# Patient Record
Sex: MALE | Employment: OTHER | URBAN - METROPOLITAN AREA
[De-identification: names, ages, dates, MRNs, and addresses within clinical notes are randomized per-mention and may not be internally consistent; named-entity substitution may affect disease eponyms.]

---

## 2019-03-12 ENCOUNTER — TRANSFERRED RECORDS (OUTPATIENT)
Dept: HEALTH INFORMATION MANAGEMENT | Facility: CLINIC | Age: 44
End: 2019-03-12

## 2020-09-21 ENCOUNTER — TRANSFERRED RECORDS (OUTPATIENT)
Dept: HEALTH INFORMATION MANAGEMENT | Facility: CLINIC | Age: 45
End: 2020-09-21

## 2020-09-21 DIAGNOSIS — D32.9 MENINGIOMA (H): Primary | ICD-10-CM

## 2020-09-23 ENCOUNTER — OFFICE VISIT (OUTPATIENT)
Dept: RADIATION ONCOLOGY | Facility: CLINIC | Age: 45
End: 2020-09-23
Attending: RADIOLOGY
Payer: COMMERCIAL

## 2020-09-23 ENCOUNTER — HOSPITAL ENCOUNTER (OUTPATIENT)
Dept: MEDSURG UNIT | Facility: CLINIC | Age: 45
End: 2020-09-23
Attending: RADIOLOGY
Payer: COMMERCIAL

## 2020-09-23 ENCOUNTER — HOSPITAL ENCOUNTER (OUTPATIENT)
Dept: MRI IMAGING | Facility: CLINIC | Age: 45
End: 2020-09-23
Attending: NEUROLOGICAL SURGERY
Payer: COMMERCIAL

## 2020-09-23 VITALS
SYSTOLIC BLOOD PRESSURE: 140 MMHG | HEART RATE: 91 BPM | DIASTOLIC BLOOD PRESSURE: 92 MMHG | RESPIRATION RATE: 20 BRPM | OXYGEN SATURATION: 95 %

## 2020-09-23 VITALS
RESPIRATION RATE: 20 BRPM | OXYGEN SATURATION: 96 % | SYSTOLIC BLOOD PRESSURE: 138 MMHG | DIASTOLIC BLOOD PRESSURE: 80 MMHG | HEART RATE: 85 BPM

## 2020-09-23 DIAGNOSIS — D32.9 MENINGIOMA (H): ICD-10-CM

## 2020-09-23 DIAGNOSIS — D32.9 MENINGIOMA (H): Primary | ICD-10-CM

## 2020-09-23 PROCEDURE — 70552 MRI BRAIN STEM W/DYE: CPT

## 2020-09-23 PROCEDURE — 25000131 ZZH RX MED GY IP 250 OP 636 PS 637: Mod: ZF | Performed by: NEUROLOGICAL SURGERY

## 2020-09-23 PROCEDURE — 25000128 H RX IP 250 OP 636: Mod: ZF | Performed by: NEUROLOGICAL SURGERY

## 2020-09-23 PROCEDURE — 25000125 ZZHC RX 250: Mod: ZF | Performed by: NEUROLOGICAL SURGERY

## 2020-09-23 PROCEDURE — 40000172 ZZH STATISTIC PROCEDURE PREP ONLY

## 2020-09-23 PROCEDURE — 77334 RADIATION TREATMENT AID(S): CPT | Performed by: RADIOLOGY

## 2020-09-23 PROCEDURE — 77370 RADIATION PHYSICS CONSULT: CPT | Performed by: RADIOLOGY

## 2020-09-23 PROCEDURE — 25000132 ZZH RX MED GY IP 250 OP 250 PS 637: Mod: ZF | Performed by: NEUROLOGICAL SURGERY

## 2020-09-23 PROCEDURE — 25500064 ZZH RX 255 OP 636: Performed by: NEUROLOGICAL SURGERY

## 2020-09-23 PROCEDURE — A9585 GADOBUTROL INJECTION: HCPCS | Performed by: NEUROLOGICAL SURGERY

## 2020-09-23 PROCEDURE — 77371 SRS MULTISOURCE: CPT | Performed by: RADIOLOGY

## 2020-09-23 PROCEDURE — 77300 RADIATION THERAPY DOSE PLAN: CPT | Performed by: RADIOLOGY

## 2020-09-23 PROCEDURE — 77295 3-D RADIOTHERAPY PLAN: CPT | Performed by: RADIOLOGY

## 2020-09-23 RX ORDER — HYDROCODONE BITARTRATE AND ACETAMINOPHEN 5; 325 MG/1; MG/1
1-2 TABLET ORAL EVERY 6 HOURS PRN
Status: DISCONTINUED | OUTPATIENT
Start: 2020-09-23 | End: 2020-09-23 | Stop reason: HOSPADM

## 2020-09-23 RX ORDER — FEXOFENADINE HCL AND PSEUDOEPHEDRINE HCI 60; 120 MG/1; MG/1
1 TABLET, EXTENDED RELEASE ORAL 2 TIMES DAILY
COMMUNITY

## 2020-09-23 RX ORDER — ACETAMINOPHEN 325 MG/1
325-650 TABLET ORAL EVERY 6 HOURS PRN
COMMUNITY

## 2020-09-23 RX ORDER — LORAZEPAM 0.5 MG/1
.5-1 TABLET ORAL
Status: COMPLETED | OUTPATIENT
Start: 2020-09-23 | End: 2020-09-23

## 2020-09-23 RX ORDER — ONDANSETRON 4 MG/1
8 TABLET, ORALLY DISINTEGRATING ORAL EVERY 6 HOURS PRN
Status: DISCONTINUED | OUTPATIENT
Start: 2020-09-23 | End: 2020-09-23 | Stop reason: HOSPADM

## 2020-09-23 RX ORDER — LORAZEPAM 0.5 MG/1
.5-2 TABLET ORAL
Status: COMPLETED | OUTPATIENT
Start: 2020-09-23 | End: 2020-09-23

## 2020-09-23 RX ORDER — LIDOCAINE 40 MG/G
1 CREAM TOPICAL SEE ADMIN INSTRUCTIONS
Status: COMPLETED | OUTPATIENT
Start: 2020-09-23 | End: 2020-09-23

## 2020-09-23 RX ORDER — ONDANSETRON 2 MG/ML
4 INJECTION INTRAMUSCULAR; INTRAVENOUS
Status: DISCONTINUED | OUTPATIENT
Start: 2020-09-23 | End: 2020-09-23 | Stop reason: HOSPADM

## 2020-09-23 RX ORDER — GADOBUTROL 604.72 MG/ML
10 INJECTION INTRAVENOUS ONCE
Status: COMPLETED | OUTPATIENT
Start: 2020-09-23 | End: 2020-09-23

## 2020-09-23 RX ORDER — DEXAMETHASONE 4 MG/1
4 TABLET ORAL
Status: COMPLETED | OUTPATIENT
Start: 2020-09-23 | End: 2020-09-23

## 2020-09-23 RX ORDER — ACETAMINOPHEN 325 MG/1
650 TABLET ORAL EVERY 4 HOURS PRN
Status: DISCONTINUED | OUTPATIENT
Start: 2020-09-23 | End: 2020-09-23 | Stop reason: HOSPADM

## 2020-09-23 RX ADMIN — LIDOCAINE 1 G: 40 CREAM TOPICAL at 05:55

## 2020-09-23 RX ADMIN — GADOBUTROL 10 ML: 604.72 INJECTION INTRAVENOUS at 08:18

## 2020-09-23 RX ADMIN — LIDOCAINE HYDROCHLORIDE,EPINEPHRINE BITARTRATE 30 ML: 20; .01 INJECTION, SOLUTION INFILTRATION; PERINEURAL at 05:55

## 2020-09-23 RX ADMIN — LORAZEPAM 1 MG: 0.5 TABLET ORAL at 10:52

## 2020-09-23 RX ADMIN — DEXAMETHASONE 4 MG: 4 TABLET ORAL at 11:16

## 2020-09-23 RX ADMIN — LORAZEPAM 2 MG: 0.5 TABLET ORAL at 05:56

## 2020-09-23 NOTE — PROGRESS NOTES
GAMMA KNIFE RADIOSURGERY TREATMENT SUMMARY  DEPARTMENT OF RADIATION ONCOLOGY    Name: Rasta Taylor                                        : 1975                 Medical Record #: [id number]                       Diagnosis:  Meningioma                                  Date of Treatment: 2020                                       Referring Physicians:  Efe Doherty, Karissa Jones, Edith Lizama, Tumor Registry     BRIEF CLINICAL HISTORY:                                                                                                 The patient is a 45 year old white male who was well until 2015 when he fell at work and sustained a concussion and neck injury.  Because of persistent headaches, he underwent a brain MRI in  which showed an incidental 1.6cm left parietal parasagittal meningioma.  Repeat brain MRI 17 showed growth of the lesion to 1.9cm.  On 10/30/17, he underwent a craniotomy and near total resection of the tumor.  Pathology confirmed a grade 1 meningioma. Follow up MRIs in  and  showed a small amount of residual tumor.  MRI 20 showed growth of the tumor to 1.1 cm.  He underwent GK radiosurgery as detailed below.  TECHNICAL SUMMARY        Collimators    # Site Energy Min Tumor Dose (Gy) IDL (%) # Size (mm) Treatment Vol. (cc)   1 Left Post. Parietal Parasagittal Meningioma [place] 60 13 50 18 4,8 3.82     DESCRIPTION OF PROCEDURE:                                                                              On 2020 the patient was brought to the Gamma Knife suite at [facility].  After sedation and topical anesthetic, the head frame was put on by Dr. Efe Doherty.  The patient was then taken to the department of Radiology where a stereotactic brain MRI was performed.  The patient was admitted to the [facility] where he rested comfortably while treatment planning was completed.  The [employer] Gamma Plan software was  used to create a highly conformal dose distribution using the number and size collimators detailed above.  The patient was brought to the Gamma Knife suite.  The treatment was delivered using the [employer] Gamma Knife ICON without complication.  The head frame was removed and the patient was discharged home in stable condition.  FOLLOW-UP PLANS:                                                                                                        The Gamma Knife Nurse Coordinator will call the patient tomorrow for short-term follow up.  He should have a brain MRI in 12 months and every 12 months thereafter.  The patient will also follow-up with Ada Doherty and Karen.  I would be happy to see him anytime.    Andrey Chong MD, [facility], [facility]

## 2020-09-23 NOTE — LETTER
9/23/2020         RE: Rasta Taylor  [address] [place] [address]        Dear Colleague,    Thank you for referring your patient, Rasta Taylor, to the [facility] [place], RADIATION ONCOLOGY. Please see a copy of my visit note below.    PREOPERATIVE DIAGNOSIS:  Recurrent meningioma    POSTOPERATIVE DIAGNOSIS:  Same    OPERATIVE PROCEDURES:  1.  Gamma Knife radiosurgery to recurrent left parietal meningioma  2.  Application of stereotactic head frame for stereotactic radiosurgery    SURGEON:  Efe Doherty MD    ASSISTANT:  None    ANESTHESIA:  Local    INDICATIONS FOR THE PROCEDURE:  Mr. Rasta Taylor is a 46 y/o male with a history of left parietal meningioma WHO grade 1.  Mr. Taylor underwent craniotomy for tumor resection in October 2017.  He has since been followed with serial imaging.  Recent imaging has shown growth in residual/recurrent tumor associated with the posterior superior sagittal sinus.  Gamma Knife stereotactic radiosurgery was now recommended to treat this lesion.    DESCRIPTION OF THE PROCEDURE:  On the morning of the procedure, the patient was brought to the Gamma Knife radiosurgery area.  A pre-procedure pause was performed to confirm the identity and date of birth of the patient, as well as the procedure site.  The scalp was prepped with betadine and then anesthetized with a combination of marcaine, lidocaine, and epinephrine.  The [employer] G-frame was applied and the pins were fixed to hand tightness.  The patient tolerated the application of the frame well.    The patient was taken to the MRI scanner where an MRI with gadolinium contrast was obtained.  The patient returned from MRI.  The lesion was outlined on the planning software and we made a conformal dose outline for this lesion.  Dr. Chong selected the radiation dose for the lesion.  Measurements were taken,  steps performed, and the patient was then brought into the treatment room.  Radiation was given  according to the prescription.    The patient was then taken out of the unit and out of the treatment room.  The stereotactic frame was removed and a dressing was applied.  After observation, the patient was discharged.  Follow up arrangements will be made for the patient.    I attest that I was present for the entirety of the case, including pre-procedure assessment, stereotactic head frame placement, treatment planning, treatment delivery, as well as frame removal at the end of the treatment.            GAMMA KNIFE RADIOSURGERY TREATMENT SUMMARY  DEPARTMENT OF RADIATION ONCOLOGY    Name: Rasta Taylor                                        : 1975                 Medical Record #: [id number]                       Diagnosis:  Meningioma                                  Date of Treatment: 2020                                       Referring Physicians:  Efe Doherty, Karissa Jones, Edith Lizama, Tumor Registry     BRIEF CLINICAL HISTORY:                                                                                                 The patient is a 45 year old white male who was well until 2015 when he fell at work and sustained a concussion and neck injury.  Because of persistent headaches, he underwent a brain MRI in  which showed an incidental 1.6cm left parietal parasagittal meningioma.  Repeat brain MRI 17 showed growth of the lesion to 1.9cm.  On 10/30/17, he underwent a craniotomy and near total resection of the tumor.  Pathology confirmed a grade 1 meningioma. Follow up MRIs in  and 2019 showed a small amount of residual tumor.  MRI 20 showed growth of the tumor to 1.1 cm.  He underwent GK radiosurgery as detailed below.  TECHNICAL SUMMARY        Collimators    # Site Energy Min Tumor Dose (Gy) IDL (%) # Size (mm) Treatment Vol. (cc)   1 Left Post. Parietal Parasagittal Meningioma [place] 60 13 50 18 4,8 3.82     DESCRIPTION OF PROCEDURE:                                                                               On 2020 the patient was brought to the Gamma Knife suite at [facility].  After sedation and topical anesthetic, the head frame was put on by Dr. Efe Doherty.  The patient was then taken to the department of Radiology where a stereotactic brain MRI was performed.  The patient was admitted to the [facility] where he rested comfortably while treatment planning was completed.  The [employer] Gamma Plan software was used to create a highly conformal dose distribution using the number and size collimators detailed above.  The patient was brought to the Gamma Knife suite.  The treatment was delivered using the [employer] Gamma Knife ICON without complication.  The head frame was removed and the patient was discharged home in stable condition.  FOLLOW-UP PLANS:                                                                                                        The Gamma Knife Nurse Coordinator will call the patient tomorrow for short-term follow up.  He should have a brain MRI in 12 months and every 12 months thereafter.  The patient will also follow-up with Ada Doherty and Karen.  I would be happy to see him anytime.    Andrey Chong MD, [facility], [facility]        GAMMA KNIFE RADIOSURGERY TREATMENT NOTE  DEPARTMENT OF RADIATION ONCOLOGY    Name: Rasta Taylor    : 1975 Medical Record #: [id number]   Diagnosis: Meningioma   Date of Treatment: 20   Referring Physicians: No referring provider defined for this encounter.     The patient was assessed prior to treatment and wished to proceed with the treatment.  All imaging, accurate patient set-up and positioning was reviewed by myself.  Dose delivery and treatment parameters were also reviewed and are within treatment specifications.  GK radiosurgery was delivered as detailed in the treatment summary note.  The patient was assessed again after treatment with no change in  status.    S: No new complaints or symptoms.  O: AVSS.  Neurological exam shows no new neurologic deficits.  A: stable  P: He will have a followup brain MRI annually and see Dr. Doherty.  I would be happy to see him anytime.      Andrey Chong MD, [facility], [facility]

## 2020-09-23 NOTE — PROGRESS NOTES
Pt concerned with cloustrophobia with treatment; discussed with Gamma RN, gave 4 mg Ativan as that is what he has this am and worked well and pt remained alert but comfortable. Med given per timing suggested by gamma staff in order to be working by treatment time.

## 2020-09-23 NOTE — PROGRESS NOTES
GAMMA KNIFE RADIOSURGERY TREATMENT NOTE  DEPARTMENT OF RADIATION ONCOLOGY    Name: Rasta Taylor    : 1975 Medical Record #: [id number]   Diagnosis: Meningioma   Date of Treatment: 20   Referring Physicians: No referring provider defined for this encounter.     The patient was assessed prior to treatment and wished to proceed with the treatment.  All imaging, accurate patient set-up and positioning was reviewed by myself.  Dose delivery and treatment parameters were also reviewed and are within treatment specifications.  GK radiosurgery was delivered as detailed in the treatment summary note.  The patient was assessed again after treatment with no change in status.    S: No new complaints or symptoms.  O: AVSS.  Neurological exam shows no new neurologic deficits.  A: stable  P: He will have a followup brain MRI annually and see Dr. Doherty.  I would be happy to see him anytime.      Andrey Chong MD, [facility], [facility]

## 2020-09-23 NOTE — PROGRESS NOTES
Pt on 2A post halo placement for Gamma treatment; pt awake and alert, denies pain. Family at bedside. Will house on 2A until ready for procedure. Oriented to unit.

## 2020-09-23 NOTE — PROGRESS NOTES
PREOPERATIVE DIAGNOSIS:  Recurrent meningioma    POSTOPERATIVE DIAGNOSIS:  Same    OPERATIVE PROCEDURES:  1.  Gamma Knife radiosurgery to recurrent left parietal meningioma  2.  Application of stereotactic head frame for stereotactic radiosurgery    SURGEON:  Efe Doherty MD    ASSISTANT:  None    ANESTHESIA:  Local    INDICATIONS FOR THE PROCEDURE:  Mr. Rasta Taylor is a 46 y/o male with a history of left parietal meningioma WHO grade 1.  Mr. Taylor underwent craniotomy for tumor resection in October 2017.  He has since been followed with serial imaging.  Recent imaging has shown growth in residual/recurrent tumor associated with the posterior superior sagittal sinus.  Gamma Knife stereotactic radiosurgery was now recommended to treat this lesion.    DESCRIPTION OF THE PROCEDURE:  On the morning of the procedure, the patient was brought to the Gamma Knife radiosurgery area.  A pre-procedure pause was performed to confirm the identity and date of birth of the patient, as well as the procedure site.  The scalp was prepped with betadine and then anesthetized with a combination of marcaine, lidocaine, and epinephrine.  The [employer] G-frame was applied and the pins were fixed to hand tightness.  The patient tolerated the application of the frame well.    The patient was taken to the MRI scanner where an MRI with gadolinium contrast was obtained.  The patient returned from MRI.  The lesion was outlined on the planning software and we made a conformal dose outline for this lesion.  Dr. Chong selected the radiation dose for the lesion.  Measurements were taken,  steps performed, and the patient was then brought into the treatment room.  Radiation was given according to the prescription.    The patient was then taken out of the unit and out of the treatment room.  The stereotactic frame was removed and a dressing was applied.  After observation, the patient was discharged.  Follow up arrangements will  be made for the patient.    I attest that I was present for the entirety of the case, including pre-procedure assessment, stereotactic head frame placement, treatment planning, treatment delivery, as well as frame removal at the end of the treatment.

## 2020-09-28 ENCOUNTER — TELEPHONE (OUTPATIENT)
Dept: RADIATION ONCOLOGY | Facility: CLINIC | Age: 45
End: 2020-09-28

## 2020-09-28 NOTE — TELEPHONE ENCOUNTER
A telephone voice message had been left for Jorge on 9/24 post Gamma Knife 9/23/20 to see how he did.  Jorge called back today and stated that on Friday 9/25/20 he did speak to Dr. Rojo regarding fatigue and headaches and was started on a Solu Medrol dose glen.  Jorge stated that he is on day three and the headaches are resolved but he is still feeling fatigued, but he does feel betteer.

## 2021-05-09 ENCOUNTER — HEALTH MAINTENANCE LETTER (OUTPATIENT)
Age: 46
End: 2021-05-09

## 2021-10-24 ENCOUNTER — HEALTH MAINTENANCE LETTER (OUTPATIENT)
Age: 46
End: 2021-10-24

## 2022-06-05 ENCOUNTER — HEALTH MAINTENANCE LETTER (OUTPATIENT)
Age: 47
End: 2022-06-05

## 2022-10-15 ENCOUNTER — HEALTH MAINTENANCE LETTER (OUTPATIENT)
Age: 47
End: 2022-10-15

## 2023-06-11 ENCOUNTER — HEALTH MAINTENANCE LETTER (OUTPATIENT)
Age: 48
End: 2023-06-11

## 2024-04-04 ENCOUNTER — TRANSFERRED RECORDS (OUTPATIENT)
Dept: HEALTH INFORMATION MANAGEMENT | Facility: CLINIC | Age: 49
End: 2024-04-04
Payer: COMMERCIAL

## 2024-04-04 ENCOUNTER — MEDICAL CORRESPONDENCE (OUTPATIENT)
Dept: HEALTH INFORMATION MANAGEMENT | Facility: CLINIC | Age: 49
End: 2024-04-04
Payer: COMMERCIAL

## 2024-04-05 DIAGNOSIS — R40.4 SPELL OF ALTERED CONSCIOUSNESS: Primary | ICD-10-CM

## 2024-04-10 ENCOUNTER — OFFICE VISIT (OUTPATIENT)
Dept: CARDIOLOGY | Facility: CLINIC | Age: 49
End: 2024-04-10
Attending: PSYCHIATRY & NEUROLOGY
Payer: COMMERCIAL

## 2024-04-10 ENCOUNTER — ORDERS ONLY (AUTO-RELEASED) (OUTPATIENT)
Dept: CARDIOLOGY | Facility: CLINIC | Age: 49
End: 2024-04-10
Payer: COMMERCIAL

## 2024-04-10 VITALS
DIASTOLIC BLOOD PRESSURE: 77 MMHG | HEIGHT: 70 IN | WEIGHT: 272.9 LBS | BODY MASS INDEX: 39.07 KG/M2 | HEART RATE: 94 BPM | SYSTOLIC BLOOD PRESSURE: 110 MMHG | OXYGEN SATURATION: 97 %

## 2024-04-10 DIAGNOSIS — E66.09 CLASS 2 OBESITY DUE TO EXCESS CALORIES WITHOUT SERIOUS COMORBIDITY WITH BODY MASS INDEX (BMI) OF 39.0 TO 39.9 IN ADULT: ICD-10-CM

## 2024-04-10 DIAGNOSIS — G47.33 OBSTRUCTIVE SLEEP APNEA SYNDROME: ICD-10-CM

## 2024-04-10 DIAGNOSIS — I10 BENIGN ESSENTIAL HYPERTENSION: ICD-10-CM

## 2024-04-10 DIAGNOSIS — E66.812 CLASS 2 OBESITY DUE TO EXCESS CALORIES WITHOUT SERIOUS COMORBIDITY WITH BODY MASS INDEX (BMI) OF 39.0 TO 39.9 IN ADULT: ICD-10-CM

## 2024-04-10 DIAGNOSIS — R55 SYNCOPE, UNSPECIFIED SYNCOPE TYPE: Primary | ICD-10-CM

## 2024-04-10 DIAGNOSIS — R55 SYNCOPE, UNSPECIFIED SYNCOPE TYPE: ICD-10-CM

## 2024-04-10 PROCEDURE — 99204 OFFICE O/P NEW MOD 45 MIN: CPT | Performed by: INTERNAL MEDICINE

## 2024-04-10 RX ORDER — LOSARTAN POTASSIUM AND HYDROCHLOROTHIAZIDE 12.5; 5 MG/1; MG/1
1 TABLET ORAL
COMMUNITY
Start: 2024-04-03

## 2024-04-10 RX ORDER — CITALOPRAM HYDROBROMIDE 20 MG/1
20 TABLET ORAL DAILY
COMMUNITY

## 2024-04-10 RX ORDER — LANOLIN ALCOHOL/MO/W.PET/CERES
3 CREAM (GRAM) TOPICAL AT BEDTIME
COMMUNITY

## 2024-04-10 NOTE — LETTER
4/10/2024    [facility]  [address] [place] [address]    RE: Rasta Taylor       Dear Colleague,     I had the pleasure of seeing Rasta Taylor in the [facility].  HPI and Plan:   Rasta is a very nice 49-year-old gentleman referred for evaluation after a syncopal spell.    Rasta has a past medical history significant for concussion about 11 years ago.  Previous meningioma removal, obstructive sleep apnea, obesity, and hypertension.  He has no family history of coronary disease with his mom and dad still alive in their late 70s.  He does not have diabetes mellitus.  I cannot find any cholesterol profile.  He is a lifelong non-smoker.  He states he drinks 5-10 beers on the weekend but nothing during the week.    Review of ER records and Rasta states he was out for dinner.  He had had 2 beers there finishing up dinner when he began to feel like he was graying out.  His wife and friends noted and lowered him to the floor.  They report no seizure activity or loss of urine.  They think he was out for about 4 to 5 minutes.  And then came to without any postictal state.  In the ER he was found to be quite hypotensive.  Monitoring demonstrated no arrhythmias.  He had normal blood work including troponins electrolytes CBC creatinine was a bit elevated 1.45 giving him a GFR of 59.  He states he had eaten that day but may be had not drank very much.  At that time his antihypertensive regimen included lisinopril and hydrochlorothiazide.  Propranolol was listed although he states he has not taken that in years.  He has subsequently changed to losartan hydrochlorothiazide because of a cough.  EKG demonstrated normal sinus rhythm incomplete right bundle branch block nonspecific ST-T wave changes.  He also reports having COVID about 1 month prior to this event.    Patient denies having chest, arm, neck, jaw or shoulder discomfort.  No dyspnea on exertion, orthopnea, or PND.  No  palpitations, lightheadedness, dizziness, syncope, or near syncope.  No peripheral edema.    Assessment and plan.  ER's assessment and I am in agreement that this appears to be most likely hypotensive in origin.  He probably was a bit intravascularly deplete with his hydrochlorothiazide and not drinking enough fluids.  There is also the vasodilating effect of his lisinopril and alcohol.  Blood pressure today is 110/77.    At this time to proceed with a stress echocardiogram.  This will allow us to see if he has structurally normal heart.  It will also see what his heart rate and blood pressure response is to exercise.  When he comes in I will have him come in fasting to check a fasting lipid profile.  If this is significantly elevated I may consider a calcium score.    I will also set him up with a 7-day Zio patch to look for any silent arrhythmias or heart block.    Long-term I have recommended he stay well-hydrated.  Exercise regularly and lose weight.    Further evaluation treatment depend upon the above results. Thank you for allow me to participate in this patient's care.  Sincerely,                               Jhon Chang MD [facility]            Today's clinic visit entailed:  Review of the result(s) of each unique test - ER records, [facility] records neurology records, lab work, EKG  The following tests were independently interpreted by me as noted in my documentation: EKG  Ordering of each unique test  Prescription drug management  46 minutes spent by me on the date of the encounter doing chart review, history and exam, documentation and further activities per the note  Provider  Link to [facility] Help Grid     The level of medical decision making during this visit was of moderate complexity.      Orders Placed This Encounter   Procedures    Exercise Stress Echocardiogram       Orders Placed This Encounter   Medications    citalopram (CELEXA) 20 MG tablet     Sig: Take 20 mg by mouth daily     losartan-hydrochlorothiazide (HYZAAR) 50-12.5 MG tablet     Sig: Take 1 tablet by mouth daily at 2 pm    melatonin 3 MG tablet     Sig: Take 3 mg by mouth at bedtime       There are no discontinued medications.      Encounter Diagnoses   Name Primary?    Syncope, unspecified syncope type Yes    Class 2 obesity due to excess calories without serious comorbidity with body mass index (BMI) of 39.0 to 39.9 in adult     Benign essential hypertension     Obstructive sleep apnea syndrome        CURRENT MEDICATIONS:  Current Outpatient Medications   Medication Sig Dispense Refill    acetaminophen (TYLENOL) 325 MG tablet Take 325-650 mg by mouth every 6 hours as needed for mild pain      citalopram (CELEXA) 20 MG tablet Take 20 mg by mouth daily      fexofenadine-pseudoePHEDrine (ALLEGRA-D)  MG 12 hr tablet Take 1 tablet by mouth 2 times daily      melatonin 3 MG tablet Take 3 mg by mouth at bedtime      losartan-hydrochlorothiazide (HYZAAR) 50-12.5 MG tablet Take 1 tablet by mouth daily at 2 pm (Patient not taking: Reported on 4/10/2024)         ALLERGIES   No Known Allergies    PAST MEDICAL HISTORY:  No past medical history on file.    PAST SURGICAL HISTORY:  No past surgical history on file.    FAMILY HISTORY:  No family history on file.    SOCIAL HISTORY:  Social History     Socioeconomic History    Marital status:      Social Determinants of Health     Interpersonal Safety: Not At Risk (3/1/2024)    Received from [facility]     Humiliation, Afraid, Rape, and Kick questionnaire     Fear of Current or Ex-Partner: No     Emotionally Abused: No     Physically Abused: No     Sexually Abused: No       Review of Systems:  Skin:  Negative rash;bruising;lumps or bumps     Eyes:  Negative visual blurring;double vision    ENT:  Negative tinnitus;vertigo    Respiratory:  Negative shortness of breath     Cardiovascular:  Negative;palpitations;chest pain;edema Positive for;fatigue;lightheadedness Patient  "stated they get lightheaded one or twice a day randomly  Gastroenterology: Negative poor appetite;nausea;vomiting    Genitourinary:  Negative urinary frequency;urgency;hesitancy    Musculoskeletal:  Negative gout;fibromyalgia    Neurologic:  Positive for headaches;numbness or tingling of hands;numbness or tingling of feet Patient stated they experience headaches and numbness and tingling in the hands and feet daily  Psychiatric:  Negative excessive stress;sleep disturbances;anxiety;depression    Heme/Lymph/Imm:  Negative anemia;bleeding disorder    Endocrine:  Negative thyroid disorder;diabetes      Physical Exam:  Vitals: /77 (BP Location: Left arm, Patient Position: Sitting)   Pulse 94   Ht 1.778 m (5' 10\")   Wt 123.8 kg (272 lb 14.4 oz)   SpO2 97%   BMI 39.16 kg/m      Constitutional:  cooperative, alert and oriented, well developed, well nourished, in no acute distress obese      Skin:  warm and dry to the touch, no apparent skin lesions or masses noted          Head:  normocephalic, no masses or lesions        Eyes:  pupils equal and round, conjunctivae and lids unremarkable, sclera white, no xanthalasma, EOMS intact, no nystagmus        Lymph:      ENT:  no pallor or cyanosis, dentition good        Neck:  no carotid bruit        Respiratory:  normal breath sounds, clear to auscultation, normal A-P diameter, normal symmetry, normal respiratory excursion, no use of accessory muscles         Cardiac: regular rhythm;no murmurs, gallops or rubs detected                pulses full and equal                                        GI:           Extremities and Muscular Skeletal:  no edema;no spinal abnormalities noted;normal muscle strength and tone              Neurological:  no gross motor deficits        Psych:  affect appropriate, oriented to time, person and place        CC  Arnold Calix MD  [facility]  [address] [address]      Thank you for allowing me to " participate in the care of your patient.      Sincerely,     Jhon Chang MD     [facility]

## 2024-04-10 NOTE — PROGRESS NOTES
HPI and Plan:   Rasta is a very nice 49-year-old gentleman referred for evaluation after a syncopal spell.    Rasta has a past medical history significant for concussion about 11 years ago.  Previous meningioma removal, obstructive sleep apnea, obesity, and hypertension.  He has no family history of coronary disease with his mom and dad still alive in their late 70s.  He does not have diabetes mellitus.  I cannot find any cholesterol profile.  He is a lifelong non-smoker.  He states he drinks 5-10 beers on the weekend but nothing during the week.    Review of ER records and Rasta states he was out for dinner.  He had had 2 beers there finishing up dinner when he began to feel like he was graying out.  His wife and friends noted and lowered him to the floor.  They report no seizure activity or loss of urine.  They think he was out for about 4 to 5 minutes.  And then came to without any postictal state.  In the ER he was found to be quite hypotensive.  Monitoring demonstrated no arrhythmias.  He had normal blood work including troponins electrolytes CBC creatinine was a bit elevated 1.45 giving him a GFR of 59.  He states he had eaten that day but may be had not drank very much.  At that time his antihypertensive regimen included lisinopril and hydrochlorothiazide.  Propranolol was listed although he states he has not taken that in years.  He has subsequently changed to losartan hydrochlorothiazide because of a cough.  EKG demonstrated normal sinus rhythm incomplete right bundle branch block nonspecific ST-T wave changes.  He also reports having COVID about 1 month prior to this event.    Patient denies having chest, arm, neck, jaw or shoulder discomfort.  No dyspnea on exertion, orthopnea, or PND.  No palpitations, lightheadedness, dizziness, syncope, or near syncope.  No peripheral edema.    Assessment and plan.  ER's assessment and I am in agreement that this appears to be most likely hypotensive in origin.  He  probably was a bit intravascularly deplete with his hydrochlorothiazide and not drinking enough fluids.  There is also the vasodilating effect of his lisinopril and alcohol.  Blood pressure today is 110/77.    At this time to proceed with a stress echocardiogram.  This will allow us to see if he has structurally normal heart.  It will also see what his heart rate and blood pressure response is to exercise.  When he comes in I will have him come in fasting to check a fasting lipid profile.  If this is significantly elevated I may consider a calcium score.    I will also set him up with a 7-day Zio patch to look for any silent arrhythmias or heart block.    Long-term I have recommended he stay well-hydrated.  Exercise regularly and lose weight.    Further evaluation treatment depend upon the above results. Thank you for allow me to participate in this patient's care.  Sincerely,                               Jhon Chang MD [facility]            Today's clinic visit entailed:  Review of the result(s) of each unique test - ER records, [facility] records neurology records, lab work, EKG  The following tests were independently interpreted by me as noted in my documentation: EKG  Ordering of each unique test  Prescription drug management  46 minutes spent by me on the date of the encounter doing chart review, history and exam, documentation and further activities per the note  Provider  Link to MDM Help Grid     The level of medical decision making during this visit was of moderate complexity.      Orders Placed This Encounter   Procedures    Exercise Stress Echocardiogram       Orders Placed This Encounter   Medications    citalopram (CELEXA) 20 MG tablet     Sig: Take 20 mg by mouth daily    losartan-hydrochlorothiazide (HYZAAR) 50-12.5 MG tablet     Sig: Take 1 tablet by mouth daily at 2 pm    melatonin 3 MG tablet     Sig: Take 3 mg by mouth at bedtime       There are no discontinued medications.      Encounter Diagnoses    Name Primary?    Syncope, unspecified syncope type Yes    Class 2 obesity due to excess calories without serious comorbidity with body mass index (BMI) of 39.0 to 39.9 in adult     Benign essential hypertension     Obstructive sleep apnea syndrome        CURRENT MEDICATIONS:  Current Outpatient Medications   Medication Sig Dispense Refill    acetaminophen (TYLENOL) 325 MG tablet Take 325-650 mg by mouth every 6 hours as needed for mild pain      citalopram (CELEXA) 20 MG tablet Take 20 mg by mouth daily      fexofenadine-pseudoePHEDrine (ALLEGRA-D)  MG 12 hr tablet Take 1 tablet by mouth 2 times daily      melatonin 3 MG tablet Take 3 mg by mouth at bedtime      losartan-hydrochlorothiazide (HYZAAR) 50-12.5 MG tablet Take 1 tablet by mouth daily at 2 pm (Patient not taking: Reported on 4/10/2024)         ALLERGIES   No Known Allergies    PAST MEDICAL HISTORY:  No past medical history on file.    PAST SURGICAL HISTORY:  No past surgical history on file.    FAMILY HISTORY:  No family history on file.    SOCIAL HISTORY:  Social History     Socioeconomic History    Marital status:      Social Determinants of Health     Interpersonal Safety: Not At Risk (3/1/2024)    Received from [facility]     Humiliation, Afraid, Rape, and Kick questionnaire     Fear of Current or Ex-Partner: No     Emotionally Abused: No     Physically Abused: No     Sexually Abused: No       Review of Systems:  Skin:  Negative rash;bruising;lumps or bumps     Eyes:  Negative visual blurring;double vision    ENT:  Negative tinnitus;vertigo    Respiratory:  Negative shortness of breath     Cardiovascular:  Negative;palpitations;chest pain;edema Positive for;fatigue;lightheadedness Patient stated they get lightheaded one or twice a day randomly  Gastroenterology: Negative poor appetite;nausea;vomiting    Genitourinary:  Negative urinary frequency;urgency;hesitancy    Musculoskeletal:  Negative gout;fibromyalgia    Neurologic:   "Positive for headaches;numbness or tingling of hands;numbness or tingling of feet Patient stated they experience headaches and numbness and tingling in the hands and feet daily  Psychiatric:  Negative excessive stress;sleep disturbances;anxiety;depression    Heme/Lymph/Imm:  Negative anemia;bleeding disorder    Endocrine:  Negative thyroid disorder;diabetes      Physical Exam:  Vitals: /77 (BP Location: Left arm, Patient Position: Sitting)   Pulse 94   Ht 1.778 m (5' 10\")   Wt 123.8 kg (272 lb 14.4 oz)   SpO2 97%   BMI 39.16 kg/m      Constitutional:  cooperative, alert and oriented, well developed, well nourished, in no acute distress obese      Skin:  warm and dry to the touch, no apparent skin lesions or masses noted          Head:  normocephalic, no masses or lesions        Eyes:  pupils equal and round, conjunctivae and lids unremarkable, sclera white, no xanthalasma, EOMS intact, no nystagmus        Lymph:      ENT:  no pallor or cyanosis, dentition good        Neck:  no carotid bruit        Respiratory:  normal breath sounds, clear to auscultation, normal A-P diameter, normal symmetry, normal respiratory excursion, no use of accessory muscles         Cardiac: regular rhythm;no murmurs, gallops or rubs detected                pulses full and equal                                        GI:           Extremities and Muscular Skeletal:  no edema;no spinal abnormalities noted;normal muscle strength and tone              Neurological:  no gross motor deficits        Psych:  affect appropriate, oriented to time, person and place        CC  Arnold Calix MD  [facility]  [address] [place] [id number]                "

## 2024-04-17 ENCOUNTER — TELEPHONE (OUTPATIENT)
Dept: CARDIOLOGY | Facility: CLINIC | Age: 49
End: 2024-04-17

## 2024-04-17 ENCOUNTER — HOSPITAL ENCOUNTER (OUTPATIENT)
Dept: CARDIOLOGY | Facility: CLINIC | Age: 49
Discharge: HOME | End: 2024-04-17
Attending: INTERNAL MEDICINE | Admitting: INTERNAL MEDICINE
Payer: COMMERCIAL

## 2024-04-17 DIAGNOSIS — Z13.220 SCREENING CHOLESTEROL LEVEL: Primary | ICD-10-CM

## 2024-04-17 DIAGNOSIS — R55 SYNCOPE, UNSPECIFIED SYNCOPE TYPE: ICD-10-CM

## 2024-04-17 PROCEDURE — 93325 DOPPLER ECHO COLOR FLOW MAPG: CPT | Mod: 26 | Performed by: INTERNAL MEDICINE

## 2024-04-17 PROCEDURE — 999N000208 ECHO STRESS ECHOCARDIOGRAM

## 2024-04-17 PROCEDURE — 93018 CV STRESS TEST I&R ONLY: CPT | Performed by: INTERNAL MEDICINE

## 2024-04-17 PROCEDURE — 93321 DOPPLER ECHO F-UP/LMTD STD: CPT | Mod: 26 | Performed by: INTERNAL MEDICINE

## 2024-04-17 PROCEDURE — 255N000002 HC RX 255 OP 636: Performed by: INTERNAL MEDICINE

## 2024-04-17 PROCEDURE — 93016 CV STRESS TEST SUPVJ ONLY: CPT | Performed by: INTERNAL MEDICINE

## 2024-04-17 PROCEDURE — 93350 STRESS TTE ONLY: CPT | Mod: 26 | Performed by: INTERNAL MEDICINE

## 2024-04-17 RX ADMIN — HUMAN ALBUMIN MICROSPHERES AND PERFLUTREN 9 ML: 10; .22 INJECTION, SOLUTION INTRAVENOUS at 14:10

## 2024-04-17 NOTE — TELEPHONE ENCOUNTER
Stress echo 4/17/2024 noted. Ordered for work up of dizziness episode.    Ziopatch should be mailed back this week - pending    Lipid panel - not ordered/not drawn    Stress echo:  1. CLINICAL: Patient exercised for 9:30 on a Harshad protocol (11 METs, average  exertional tolerance). No chest pain.  2. ECG: Stress ECG is non-diagnostic due to baseline ECG abnormalities  (diffuse ST/T wave abnormalities, particularly in the inferior leads).  3. REST ECHO: Normal biventricular function. LVEF 60-65%. No wall motion  abnormalities. No significant valvular abnormalities.  4. STRESS ECHO: No inducible wall motion abnormalities. LVEF appropriately  augments to > 70%.  In summary, this is a negative exercise stress echo, with no clinical or echo  evidence of ischemia.    Per Dr. Chang's dictation 4/10/2024:  At this time to proceed with a stress echocardiogram.  This will allow us to see if he has structurally normal heart.  It will also see what his heart rate and blood pressure response is to exercise.  When he comes in I will have him come in fasting to check a fasting lipid profile.  If this is significantly elevated I may consider a calcium score.  I will also set him up with a 7-day Zio patch to look for any silent arrhythmias or heart block.    Will message Dr. Chang to review

## 2024-04-18 NOTE — TELEPHONE ENCOUNTER
Jhon Chang MD Anderson, Barbara E RN28 minutes ago (8:51 AM)     Did he get a flp?     Jhon Chang MD Anderson, Barbara E, RN29 minutes ago (8:51 AM)     Normal stress echo. Nl BP, Heart rate, rhythm, echo       Left a message for patient to call back. Orders placed for FLP/ALT.

## 2024-04-24 NOTE — TELEPHONE ENCOUNTER
Message left to return call - Were tests reviewed and needs to schedule FLP's if not already done.

## 2024-04-24 NOTE — TELEPHONE ENCOUNTER
Spoke with Patient who was not aware that FLP's were needed.  Orders were already placed.  Patient will check if his PCP is in the [facility] network to have labs drawn there or will call back with a fa # so orders could be sent.     Normal stress echo,    Will be mailing ZIO back today.     Patient agrees with plan.

## 2024-05-01 ENCOUNTER — TELEPHONE (OUTPATIENT)
Dept: CARDIOLOGY | Facility: CLINIC | Age: 49
End: 2024-05-01

## 2024-05-01 PROCEDURE — 93244 EXT ECG>48HR<7D REV&INTERPJ: CPT | Performed by: INTERNAL MEDICINE

## 2024-05-01 NOTE — TELEPHONE ENCOUNTER
Angel routed to Dr Chang, ordered at visit 4/10/24 to assess for any arrhythmia or heart block in the context of syncope. Stress echo 4/17 was negative. Patient still needs to set up lipid panel, not done.       SR avg 79bpm, range 51-138bpm  <1% PVC/PACs

## 2024-05-02 NOTE — TELEPHONE ENCOUNTER
Request faxed to [facility] @ [phone] for copy of March lipids.  To be scanned for records when received.        5/6/2024 received our fax request back stating they have no records of any lipids for patient.  Called [facility] @ [phone] to ask again for lipids. Per staff, patient has not been there since 2023. OV in 2024 but no labs were done.    1430 called patient to discuss that his labs might be from 2023. He will check at home and if they are old then he will set up a new set of lipids at his PCP. Reviewed with patient that if the PCP did not order lipids this year to let us know so we can send orders.

## 2024-05-02 NOTE — TELEPHONE ENCOUNTER
Jhon Chang MD  You14 hours ago (6:28 PM)     Okay.  Thanks.   nothing worrisome.       Spoke to patient, reviewed ziopatch results and message from Dr Chang. Regarding lipids, he said he had them checked in March at his PCP and reported them verbally as below. Dr Chang updated. Will obtain formal copy of report to scan to chart.     Collected 3/9/24 via [facility]-    CHOL 210  TRIG 220  HDL 31  VLDL 40

## 2024-05-09 NOTE — TELEPHONE ENCOUNTER
Verified with patient that the results he shared with Dr. Chang were from last year. He will schedule new lipids at his PCP clinic.  Order faxed to [facility] @ [phone]

## 2024-08-04 ENCOUNTER — HEALTH MAINTENANCE LETTER (OUTPATIENT)
Age: 49
End: 2024-08-04

## 2025-01-07 ENCOUNTER — HOSPITAL ENCOUNTER (EMERGENCY)
Facility: CLINIC | Age: 50
Discharge: HOME | End: 2025-01-07
Payer: COMMERCIAL

## 2025-01-07 ENCOUNTER — APPOINTMENT (OUTPATIENT)
Dept: CT IMAGING | Facility: CLINIC | Age: 50
End: 2025-01-07
Payer: COMMERCIAL

## 2025-01-07 VITALS
SYSTOLIC BLOOD PRESSURE: 149 MMHG | OXYGEN SATURATION: 99 % | TEMPERATURE: 98 F | HEIGHT: 70 IN | DIASTOLIC BLOOD PRESSURE: 81 MMHG | WEIGHT: 275 LBS | BODY MASS INDEX: 39.37 KG/M2 | RESPIRATION RATE: 18 BRPM | HEART RATE: 86 BPM

## 2025-01-07 DIAGNOSIS — R20.0 RIGHT ARM NUMBNESS: Primary | ICD-10-CM

## 2025-01-07 PROBLEM — G44.321 INTRACTABLE CHRONIC POST-TRAUMATIC HEADACHE: Status: ACTIVE | Noted: 2024-04-04

## 2025-01-07 PROBLEM — R51.9 HEADACHE: Status: ACTIVE | Noted: 2020-09-10

## 2025-01-07 PROBLEM — D32.9 MENINGIOMA (H): Status: ACTIVE | Noted: 2025-01-07

## 2025-01-07 PROBLEM — Z86.018 HISTORY OF MENINGIOMA: Status: ACTIVE | Noted: 2020-09-10

## 2025-01-07 PROBLEM — Z98.890 S/P CRANIOTOMY: Status: ACTIVE | Noted: 2025-01-07

## 2025-01-07 PROBLEM — R10.84 GENERALIZED ABDOMINAL PAIN: Status: ACTIVE | Noted: 2025-01-07

## 2025-01-07 PROCEDURE — 250N000011 HC RX IP 250 OP 636

## 2025-01-07 PROCEDURE — 99284 EMERGENCY DEPT VISIT MOD MDM: CPT

## 2025-01-07 PROCEDURE — 70496 CT ANGIOGRAPHY HEAD: CPT

## 2025-01-07 PROCEDURE — 250N000009 HC RX 250

## 2025-01-07 PROCEDURE — 99285 EMERGENCY DEPT VISIT HI MDM: CPT | Mod: 25

## 2025-01-07 PROCEDURE — 70450 CT HEAD/BRAIN W/O DYE: CPT

## 2025-01-07 RX ORDER — IOPAMIDOL 755 MG/ML
67 INJECTION, SOLUTION INTRAVASCULAR ONCE
Status: COMPLETED | OUTPATIENT
Start: 2025-01-07 | End: 2025-01-07

## 2025-01-07 RX ADMIN — IOPAMIDOL 67 ML: 755 INJECTION, SOLUTION INTRAVENOUS at 13:34

## 2025-01-07 RX ADMIN — SODIUM CHLORIDE 100 ML: 9 INJECTION, SOLUTION INTRAVENOUS at 13:34

## 2025-01-07 ASSESSMENT — ACTIVITIES OF DAILY LIVING (ADL)
ADLS_ACUITY_SCORE: 41

## 2025-01-07 ASSESSMENT — COLUMBIA-SUICIDE SEVERITY RATING SCALE - C-SSRS
1. IN THE PAST MONTH, HAVE YOU WISHED YOU WERE DEAD OR WISHED YOU COULD GO TO SLEEP AND NOT WAKE UP?: NO
2. HAVE YOU ACTUALLY HAD ANY THOUGHTS OF KILLING YOURSELF IN THE PAST MONTH?: NO
6. HAVE YOU EVER DONE ANYTHING, STARTED TO DO ANYTHING, OR PREPARED TO DO ANYTHING TO END YOUR LIFE?: NO

## 2025-01-07 NOTE — DISCHARGE INSTRUCTIONS
You were seen in the emergency department today for right arm numbness and neck pain. We did tests including CT scan of your head and neck that showed no clear cause for your symptoms, but was reassuring against a life-threatening cause at this time.     If symptoms continue beyond the next couple of days, please follow up with your primary doctor in the next 1 to 2 weeks for ongoing evaluation and management of your symptoms.    Return to the emergency department with new or worsening symptoms that you find concerning.

## 2025-01-07 NOTE — ED TRIAGE NOTES
"Pt presents with right side neck stiffness and \"a little bit\" of right arm numbness that started this AM. Reports a \"little bit\" of weakness in his right hand. States he got a little clammy this morning at work. C/o a little headache and sore throat. Using eye drops for pink eye.      Triage Assessment (Adult)       Row Name 01/07/25 1218          Triage Assessment    Airway WDL WDL        Respiratory WDL    Respiratory WDL WDL        Skin Circulation/Temperature WDL    Skin Circulation/Temperature WDL WDL        Cardiac WDL    Cardiac WDL WDL        Peripheral/Neurovascular WDL    Peripheral Neurovascular WDL WDL        Cognitive/Neuro/Behavioral WDL    Cognitive/Neuro/Behavioral WDL WDL                     "

## 2025-01-07 NOTE — ED PROVIDER NOTES
"There were no vitals taken for this visit.    Rasta Taylor is a 49-year-old male with past medical history significant for meningioma presenting with complaints of right-sided neck pain, right arm numbness and tingling.  Patient reports he woke up this morning with right-sided neck pain and headache.  He has not had worsening right arm numbness and tingling.  No known trauma.  Patient does report feeling \"off\" given patient's history, I recommended he/she be evaluated in the emergency department.  We discussed that he/she will likely require further testing and/or treatment beyond the capabilities of the current urgent care setting including labs and potential imaging.  Patient expresses understanding of this and agreement with the plan.  I have explained what could happen if they choose to not have the treatment/transfer.        Sydnie Almazan PA-C  01/07/25 1211    "

## 2025-01-07 NOTE — ED PROVIDER NOTES
"[facility]  Emergency Department Visit Note    PATIENT:  Rasta Taylor     49 year old     male      [id number]    Chief complaint:  Chief Complaint   Patient presents with    Numbness          History of present illness:  Patient is a 49 year old male with HTN, PEDRO, headache, meningioma status postcraniotomy presenting for evaluation of right arm numbness.    Feeling well when he went to bed last night.  Awoke at 630 this morning with sensation \"like a pinched nerve\".  Pain over the right side of his neck and numbness radiating down the right arm.  Went to work and when the symptoms persisted came to the emergency department.    Also has mild headache.  No vision changes, vomiting, chest pain, dyspnea, abdominal pain, other extremity numbness, tingling, or weakness.      Review of Systems:  As in HPI above    BP (!) 149/81   Pulse 86   Temp 98  F (36.7  C) (Tympanic)   Resp 18   Ht 1.778 m (5' 10\")   Wt 124.7 kg (275 lb)   SpO2 99%   BMI 39.46 kg/m        Physical Exam:  Constitutional: laying in hospital bed, alert, oriented, and in no apparent distress  HEENT: normocephalic, atraumatic, pupils 2mm, equal, round, and reactive to light, sclerae anicteric, and extraocular motions intact  Neck: no stridor and tenderness of the right paraspinous region  Cardiovascular: regular rate and rhythm and no murmurs, rubs, or extra heart sounds  Pulmonary: breathing comfortably on room air and lungs clear to auscultation bilaterally  Abdominal: soft, non-tender, non-distended  Extremities/MSK: no peripheral edema.  Palpable subcutaneous nodule over the right lateral proximal forearm/distal upper arm nontender without overlying skin change  Skin: warm, dry  Neurologic: moves all four extremities spontaneously, GCS 15, CNII-XII intact, 5/5  strength bilaterally, 5/5 strength in dorsiflexion and plantarflexion bilaterally, no dysmetria on finger-nose-finger, and subjectively diminished sensation " right upper extremity primarily proximally compared to left lower extremity though still has sensation intact.  Psychiatric: calm, appropriate      MDM:  Patient is a 49 year old male with above history presenting for evaluation of neck pain and right arm numbness.    Vitals grossly normal, mildly hypertensive. Exam overall is reassuring, he is alert, GCS 15, only neurologic deficit is slightly decreased in station to light touch right upper extremity.    Probably peripheral neuropathy, though with associated neck pain raises concern for dissection or ischemic stroke.  No infectious signs or symptoms.  No symptoms concerning for cardiopulmonary etiology such as aortic dissection or PE.  No chest pain concerning for ACS.    Plan for CTA head/neck.    Disposition pending above workup. Remainder of ED course below.    ED COURSE:  ED Course as of 01/07/25 1418   Tue Jan 07, 2025   1403 Head CT w/o contrast  No acute process   1413 CTA Head Neck with Contrast  No acute process   1413 Presentation most consistent with peripheral neuropathy.  Patient stable for outpatient follow-up.  Recommending primary follow-up in 1 to 2 weeks.  ED return precautions discussed in the event of new or worsening symptoms.  He expressed understanding and agreement with this.       Encounter Diagnoses:  Final diagnoses:   Right arm numbness       Final disposition: discharge    Madan Pham MD  1/7/2025  12:31 PM   Emergency Medicine  [facility]      Madan Pham MD  01/07/25 1410

## 2025-01-24 ENCOUNTER — TRANSFERRED RECORDS (OUTPATIENT)
Dept: HEALTH INFORMATION MANAGEMENT | Facility: CLINIC | Age: 50
End: 2025-01-24

## 2025-01-30 ENCOUNTER — MEDICAL CORRESPONDENCE (OUTPATIENT)
Dept: HEALTH INFORMATION MANAGEMENT | Facility: CLINIC | Age: 50
End: 2025-01-30

## 2025-02-15 ENCOUNTER — TRANSFERRED RECORDS (OUTPATIENT)
Dept: HEALTH INFORMATION MANAGEMENT | Facility: CLINIC | Age: 50
End: 2025-02-15
Payer: COMMERCIAL

## 2025-02-25 ENCOUNTER — TRANSFERRED RECORDS (OUTPATIENT)
Dept: HEALTH INFORMATION MANAGEMENT | Facility: CLINIC | Age: 50
End: 2025-02-25
Payer: COMMERCIAL

## 2025-02-26 ENCOUNTER — MEDICAL CORRESPONDENCE (OUTPATIENT)
Dept: HEALTH INFORMATION MANAGEMENT | Facility: CLINIC | Age: 50
End: 2025-02-26
Payer: COMMERCIAL

## 2025-02-26 ENCOUNTER — TRANSFERRED RECORDS (OUTPATIENT)
Dept: HEALTH INFORMATION MANAGEMENT | Facility: CLINIC | Age: 50
End: 2025-02-26
Payer: COMMERCIAL

## 2025-03-04 NOTE — TELEPHONE ENCOUNTER
Action March 4, 2025 10:27 AM MT   Action Taken Sent a request to [facility] and [facility] for imaging.       DIAGNOSIS: RIGHT FOREARM MASS   APPOINTMENT DATE: 03/20/2025   NOTES STATUS DETAILS   OFFICE NOTE from referring provider Media [facility] 02/21/2025, 02/04/2025 - Fabricio Doshi MD - [facility]   OFFICE NOTE from other specialist [facility] 01/24/2025 - Adrienne Rosenthal PA-C - [facility]   MRI PACS [facility]:  02/25/2025, 02/15/2025, 02/11/2025 - RT Elbow   CT SCAN In process [facility]:  01/24/2025 - RT Upper Extremity   XRAYS (IMAGES & REPORTS) PACS [facility]:  02/04/2025 - RT Elbow

## 2025-03-10 ENCOUNTER — TELEPHONE (OUTPATIENT)
Dept: ORTHOPEDICS | Facility: CLINIC | Age: 50
End: 2025-03-10
Payer: COMMERCIAL

## 2025-03-10 PROBLEM — M79.89 SOFT TISSUE MASS: Status: ACTIVE | Noted: 2025-03-07

## 2025-03-10 NOTE — TELEPHONE ENCOUNTER
Patient is scheduled for surgery with Dr. Salazar.     Spoke with: Patient     Date of Surgery: 4/10/25    Location: [facility] OR     Pre op with Provider: ANGI    H&P: Patient will schedule at primary clinic. Informed patient pre op will need to be completed within 30 days of surgery date.     Additional imaging/appointments: Patient is scheduled for 2 week post op on 4/30/25 at 4:20.     Surgery packet: Patient received surgery packet in clinic.      Additional comments: ANGI Cohen on 3/10/2025 at 10:56 AM

## 2025-03-12 ENCOUNTER — TELEPHONE (OUTPATIENT)
Dept: ORTHOPEDICS | Facility: CLINIC | Age: 50
End: 2025-03-12
Payer: COMMERCIAL

## 2025-03-19 ENCOUNTER — TELEPHONE (OUTPATIENT)
Dept: ORTHOPEDICS | Facility: CLINIC | Age: 50
End: 2025-03-19
Payer: COMMERCIAL

## 2025-03-19 NOTE — TELEPHONE ENCOUNTER
Teaching Flowsheet     Visit Type: Telephone    Time Start: 1332  Time End: 1342  Total Time Spent: 15 min. With prep    Surgeon: Dr Salazar  Location of Surgery (known or anticipated): [facility]   Type of Anesthesia: General  Worker's Compensation Procedure: No    Pertinent Medical History: No Pertinent Medical History  Were medical conditions reviewed and appropriate for location? Yes  BMI: Obesity Grade II BMI 35-39.9    Relevant Diagnosis: Right Forearm Mass  Teaching Topic: Pre-op EDU excision Right forearm mass    Person(s) involved in teaching:   Patient  : No.   Verified Patient's Phone Number: YES:      Caregiver//  Name: Citlaly  Phone Number: [phone]   Relationship: Wife  Consent to Communicate on file: Yes  Authorization to Share Protected Health Information- Person to person communication signed by patient and authorized the following person or people:      Motivation Level:  Asks Questions: Yes  Eager to Learn: Yes  Cooperative: Yes  Receptive (willing/able to accept information): Yes  Any cultural factors/Episcopal beliefs that may influence understanding or compliance? No     Patient demonstrates understanding of the following:  Reason for the appointment, diagnosis and treatment plan: Yes  Knowledge of proper use of medications and conditions for which they are ordered (with special attention to potential side effects or drug interactions): Yes  Which situations necessitate calling provider and whom to contact: Yes     Teaching Concerns Addressed:   Proper use and care of medical equip, care aids, etc.: Yes  Nutritional needs and diet plan: Yes  Pain management techniques: Yes  Wound Care: Yes  How and/when to access community resources: Yes  Need for pre-op with in 30 days: YES, will be done with PCP. I asked them to ensure they go over their daily medications during this visit and discuss what medications need to be stopped before surgery and when. If you are doing a  pre-op with your PCP and they are not within the [facility], I ask them to fax it to our pre-op office. Patient verbalized understanding.       Does patient have difficulty getting a ride to appointments (post-ops, PT/OT): No  Patient's plan after discharge: home with family or spouse     Instructional Materials Used/Given:  two bottles of chlorhexidine soap and a surgery packet given to patient in clinic.   - Important Contact Info/Phone Numbers: emphasizing clinic number [phone] and after hours number [phone]  - Map/location of surgery and follow-up appointments  - Showering instructions  - Stoplight Tool     -Next step: surgery scheduled for 4/10/25 and pre-op scheduled with PCP for 4/4/25.    Hardik Kennedy RN

## 2025-03-20 ENCOUNTER — PRE VISIT (OUTPATIENT)
Dept: ORTHOPEDICS | Facility: CLINIC | Age: 50
End: 2025-03-20

## 2025-04-04 ENCOUNTER — TRANSFERRED RECORDS (OUTPATIENT)
Dept: HEALTH INFORMATION MANAGEMENT | Facility: CLINIC | Age: 50
End: 2025-04-04
Payer: COMMERCIAL

## 2025-04-07 ENCOUNTER — TRANSFERRED RECORDS (OUTPATIENT)
Dept: HEALTH INFORMATION MANAGEMENT | Facility: CLINIC | Age: 50
End: 2025-04-07
Payer: COMMERCIAL

## 2025-04-09 ENCOUNTER — ANESTHESIA EVENT (OUTPATIENT)
Dept: SURGERY | Facility: AMBULATORY SURGERY CENTER | Age: 50
End: 2025-04-09
Payer: COMMERCIAL

## 2025-04-09 NOTE — ANESTHESIA PREPROCEDURE EVALUATION
"Anesthesia Pre-Procedure Evaluation    Patient: Rasta Taylor   MRN: [id number] : 1975        Procedure : Procedure(s):  Excision right forearm mass          No past medical history on file.   No past surgical history on file.   Allergies   Allergen Reactions     Dust Mite Extract      Short Ragweed Pollen Ext       Social History     Tobacco Use     Smoking status: Not on file     Smokeless tobacco: Not on file   Substance Use Topics     Alcohol use: Not on file      Wt Readings from Last 1 Encounters:   25 123.8 kg (273 lb)        Anesthesia Evaluation            ROS/MED HX  ENT/Pulmonary:     (+) sleep apnea,                                       Neurologic: Comment: Hx of meningioma(benign) removed. Hx of headaches    (+)    peripheral neuropathy, - parasthesias right thumb.                           Cardiovascular:     (+)  hypertension- -   -  - -                                      METS/Exercise Tolerance:     Hematologic:  - neg hematologic  ROS     Musculoskeletal: Comment: Right elbow soft tissue mass.  Hx of gout      GI/Hepatic:  - neg GI/hepatic ROS     Renal/Genitourinary:  - neg Renal ROS     Endo:     (+)               Obesity,       Psychiatric/Substance Use:  - neg psychiatric ROS     Infectious Disease:       Malignancy:  - neg malignancy ROS     Other:            Physical Exam    Airway        Mallampati: III   TM distance: > 3 FB   Neck ROM: full   Mouth opening: > 3 cm    Respiratory Devices and Support         Dental       (+) Minor Abnormalities - some fillings, tiny chips      Cardiovascular   cardiovascular exam normal          Pulmonary   pulmonary exam normal              OUTSIDE LABS:  CBC: No results found for: \"WBC\", \"HGB\", \"HCT\", \"PLT\"  BMP: No results found for: \"NA\", \"POTASSIUM\", \"CHLORIDE\", \"CO2\", \"BUN\", \"CR\", \"GLC\"  COAGS: No results found for: \"PTT\", \"INR\", \"FIBR\"  POC: No results found for: \"BGM\", \"HCG\", \"HCGS\"  HEPATIC: No results found for: \"ALBUMIN\", " "\"PROTTOTAL\", \"ALT\", \"AST\", \"GGT\", \"ALKPHOS\", \"BILITOTAL\", \"BILIDIRECT\", \"RHINA\"  OTHER: No results found for: \"PH\", \"LACT\", \"A1C\", \"ARCHANA\", \"PHOS\", \"MAG\", \"LIPASE\", \"AMYLASE\", \"TSH\", \"T4\", \"T3\", \"CRP\", \"SED\"    Anesthesia Plan    ASA Status:  3    NPO Status:  NPO Appropriate    Anesthesia Type: General.     - Airway: LMA   Induction: Propofol, Intravenous.   Maintenance: TIVA.        Consents    Anesthesia Plan(s) and associated risks, benefits, and realistic alternatives discussed. Questions answered and patient/representative(s) expressed understanding.     - Discussed:     - Discussed with:  Patient       Use of blood products discussed: No .     Postoperative Care    Pain management: Multi-modal analgesia, IV analgesics, Oral pain medications.   PONV prophylaxis: Ondansetron (or other 5HT-3), Dexamethasone or Solumedrol, Background Propofol Infusion     Comments:               Yamileth Chopra MD    Clinically Significant Risk Factors Present on Admission                   # Hypertension: Noted on problem list                        "

## 2025-04-10 ENCOUNTER — HOSPITAL ENCOUNTER (OUTPATIENT)
Facility: AMBULATORY SURGERY CENTER | Age: 50
Discharge: HOME | End: 2025-04-10
Attending: ORTHOPAEDIC SURGERY
Payer: COMMERCIAL

## 2025-04-10 ENCOUNTER — ANESTHESIA (OUTPATIENT)
Dept: SURGERY | Facility: AMBULATORY SURGERY CENTER | Age: 50
End: 2025-04-10
Payer: COMMERCIAL

## 2025-04-10 VITALS
WEIGHT: 275 LBS | BODY MASS INDEX: 38.5 KG/M2 | HEIGHT: 71 IN | DIASTOLIC BLOOD PRESSURE: 74 MMHG | OXYGEN SATURATION: 97 % | SYSTOLIC BLOOD PRESSURE: 115 MMHG | TEMPERATURE: 97.9 F | HEART RATE: 70 BPM | RESPIRATION RATE: 18 BRPM

## 2025-04-10 DIAGNOSIS — M79.89 SOFT TISSUE MASS: Primary | ICD-10-CM

## 2025-04-10 PROCEDURE — 88307 TISSUE EXAM BY PATHOLOGIST: CPT | Mod: TC | Performed by: ORTHOPAEDIC SURGERY

## 2025-04-10 PROCEDURE — 88307 TISSUE EXAM BY PATHOLOGIST: CPT | Mod: 26 | Performed by: PATHOLOGY

## 2025-04-10 RX ORDER — HYDROMORPHONE HYDROCHLORIDE 1 MG/ML
0.4 INJECTION, SOLUTION INTRAMUSCULAR; INTRAVENOUS; SUBCUTANEOUS EVERY 5 MIN PRN
Status: ACTIVE | OUTPATIENT
Start: 2025-04-10

## 2025-04-10 RX ORDER — DEXAMETHASONE SODIUM PHOSPHATE 4 MG/ML
INJECTION, SOLUTION INTRA-ARTICULAR; INTRALESIONAL; INTRAMUSCULAR; INTRAVENOUS; SOFT TISSUE PRN
Status: DISCONTINUED | OUTPATIENT
Start: 2025-04-10 | End: 2025-04-10

## 2025-04-10 RX ORDER — PROPOFOL 10 MG/ML
INJECTION, EMULSION INTRAVENOUS PRN
Status: DISCONTINUED | OUTPATIENT
Start: 2025-04-10 | End: 2025-04-10

## 2025-04-10 RX ORDER — IBUPROFEN 600 MG/1
600 TABLET, FILM COATED ORAL EVERY 6 HOURS PRN
Qty: 30 TABLET | Refills: 0 | Status: SHIPPED | OUTPATIENT
Start: 2025-04-10

## 2025-04-10 RX ORDER — LIDOCAINE 40 MG/G
CREAM TOPICAL
Status: ACTIVE | OUTPATIENT
Start: 2025-04-10

## 2025-04-10 RX ORDER — CEFAZOLIN SODIUM IN 0.9 % NACL 3 G/100 ML
3 INTRAVENOUS SOLUTION, PIGGYBACK (ML) INTRAVENOUS
Status: COMPLETED | OUTPATIENT
Start: 2025-04-10 | End: 2025-04-10

## 2025-04-10 RX ORDER — PROPOFOL 10 MG/ML
INJECTION, EMULSION INTRAVENOUS CONTINUOUS PRN
Status: DISCONTINUED | OUTPATIENT
Start: 2025-04-10 | End: 2025-04-10

## 2025-04-10 RX ORDER — DEXAMETHASONE SODIUM PHOSPHATE 10 MG/ML
4 INJECTION, SOLUTION INTRAMUSCULAR; INTRAVENOUS
Status: ACTIVE | OUTPATIENT
Start: 2025-04-10

## 2025-04-10 RX ORDER — HYDROMORPHONE HYDROCHLORIDE 1 MG/ML
0.2 INJECTION, SOLUTION INTRAMUSCULAR; INTRAVENOUS; SUBCUTANEOUS EVERY 5 MIN PRN
Status: ACTIVE | OUTPATIENT
Start: 2025-04-10

## 2025-04-10 RX ORDER — ONDANSETRON 4 MG/1
4 TABLET, ORALLY DISINTEGRATING ORAL EVERY 30 MIN PRN
Status: ACTIVE | OUTPATIENT
Start: 2025-04-10

## 2025-04-10 RX ORDER — OXYCODONE HYDROCHLORIDE 5 MG/1
5 TABLET ORAL
Status: ACTIVE | OUTPATIENT
Start: 2025-04-10

## 2025-04-10 RX ORDER — LIDOCAINE HYDROCHLORIDE 20 MG/ML
INJECTION, SOLUTION INFILTRATION; PERINEURAL PRN
Status: DISCONTINUED | OUTPATIENT
Start: 2025-04-10 | End: 2025-04-10

## 2025-04-10 RX ORDER — BUPIVACAINE HYDROCHLORIDE 2.5 MG/ML
INJECTION, SOLUTION INFILTRATION; PERINEURAL PRN
Status: DISCONTINUED | OUTPATIENT
Start: 2025-04-10 | End: 2025-04-10 | Stop reason: HOSPADM

## 2025-04-10 RX ORDER — CEFAZOLIN SODIUM IN 0.9 % NACL 3 G/100 ML
3 INTRAVENOUS SOLUTION, PIGGYBACK (ML) INTRAVENOUS SEE ADMIN INSTRUCTIONS
OUTPATIENT
Start: 2025-04-10

## 2025-04-10 RX ORDER — ACETAMINOPHEN 325 MG/1
975 TABLET ORAL ONCE
Status: COMPLETED | OUTPATIENT
Start: 2025-04-10 | End: 2025-04-10

## 2025-04-10 RX ORDER — FENTANYL CITRATE 50 UG/ML
25 INJECTION, SOLUTION INTRAMUSCULAR; INTRAVENOUS
Status: ACTIVE | OUTPATIENT
Start: 2025-04-10

## 2025-04-10 RX ORDER — MAGNESIUM HYDROXIDE 1200 MG/15ML
LIQUID ORAL PRN
Status: DISCONTINUED | OUTPATIENT
Start: 2025-04-10 | End: 2025-04-10 | Stop reason: HOSPADM

## 2025-04-10 RX ORDER — GLYCOPYRROLATE 0.2 MG/ML
INJECTION, SOLUTION INTRAMUSCULAR; INTRAVENOUS PRN
Status: DISCONTINUED | OUTPATIENT
Start: 2025-04-10 | End: 2025-04-10

## 2025-04-10 RX ORDER — OXYCODONE HYDROCHLORIDE 5 MG/1
10 TABLET ORAL
Status: ACTIVE | OUTPATIENT
Start: 2025-04-10

## 2025-04-10 RX ORDER — ONDANSETRON 2 MG/ML
4 INJECTION INTRAMUSCULAR; INTRAVENOUS EVERY 30 MIN PRN
Status: ACTIVE | OUTPATIENT
Start: 2025-04-10

## 2025-04-10 RX ORDER — FENTANYL CITRATE 50 UG/ML
50 INJECTION, SOLUTION INTRAMUSCULAR; INTRAVENOUS EVERY 5 MIN PRN
Status: ACTIVE | OUTPATIENT
Start: 2025-04-10

## 2025-04-10 RX ORDER — FENTANYL CITRATE 50 UG/ML
25 INJECTION, SOLUTION INTRAMUSCULAR; INTRAVENOUS EVERY 5 MIN PRN
Status: ACTIVE | OUTPATIENT
Start: 2025-04-10

## 2025-04-10 RX ORDER — ACETAMINOPHEN 325 MG/1
650 TABLET ORAL EVERY 4 HOURS PRN
Qty: 50 TABLET | Refills: 0 | Status: SHIPPED | OUTPATIENT
Start: 2025-04-10

## 2025-04-10 RX ORDER — NALOXONE HYDROCHLORIDE 0.4 MG/ML
0.1 INJECTION, SOLUTION INTRAMUSCULAR; INTRAVENOUS; SUBCUTANEOUS
Status: ACTIVE | OUTPATIENT
Start: 2025-04-10

## 2025-04-10 RX ORDER — ONDANSETRON 2 MG/ML
INJECTION INTRAMUSCULAR; INTRAVENOUS PRN
Status: DISCONTINUED | OUTPATIENT
Start: 2025-04-10 | End: 2025-04-10

## 2025-04-10 RX ORDER — KETOROLAC TROMETHAMINE 30 MG/ML
INJECTION, SOLUTION INTRAMUSCULAR; INTRAVENOUS PRN
Status: DISCONTINUED | OUTPATIENT
Start: 2025-04-10 | End: 2025-04-10

## 2025-04-10 RX ORDER — LABETALOL HYDROCHLORIDE 5 MG/ML
10 INJECTION, SOLUTION INTRAVENOUS
Status: ACTIVE | OUTPATIENT
Start: 2025-04-10

## 2025-04-10 RX ORDER — OXYCODONE HYDROCHLORIDE 5 MG/1
5 TABLET ORAL EVERY 6 HOURS PRN
Qty: 12 TABLET | Refills: 0 | Status: SHIPPED | OUTPATIENT
Start: 2025-04-10 | End: 2025-04-13

## 2025-04-10 RX ORDER — SODIUM CHLORIDE, SODIUM LACTATE, POTASSIUM CHLORIDE, CALCIUM CHLORIDE 600; 310; 30; 20 MG/100ML; MG/100ML; MG/100ML; MG/100ML
INJECTION, SOLUTION INTRAVENOUS CONTINUOUS
Status: ACTIVE | OUTPATIENT
Start: 2025-04-10

## 2025-04-10 RX ORDER — FENTANYL CITRATE 50 UG/ML
INJECTION, SOLUTION INTRAMUSCULAR; INTRAVENOUS PRN
Status: DISCONTINUED | OUTPATIENT
Start: 2025-04-10 | End: 2025-04-10

## 2025-04-10 RX ORDER — SODIUM CHLORIDE, SODIUM LACTATE, POTASSIUM CHLORIDE, CALCIUM CHLORIDE 600; 310; 30; 20 MG/100ML; MG/100ML; MG/100ML; MG/100ML
INJECTION, SOLUTION INTRAVENOUS CONTINUOUS PRN
Status: DISCONTINUED | OUTPATIENT
Start: 2025-04-10 | End: 2025-04-10

## 2025-04-10 RX ORDER — MEPERIDINE HYDROCHLORIDE 25 MG/ML
12.5 INJECTION INTRAMUSCULAR; INTRAVENOUS; SUBCUTANEOUS EVERY 5 MIN PRN
Status: ACTIVE | OUTPATIENT
Start: 2025-04-10

## 2025-04-10 RX ADMIN — PROPOFOL 150 MCG/KG/MIN: 10 INJECTION, EMULSION INTRAVENOUS at 11:57

## 2025-04-10 RX ADMIN — DEXAMETHASONE SODIUM PHOSPHATE 4 MG: 4 INJECTION, SOLUTION INTRA-ARTICULAR; INTRALESIONAL; INTRAMUSCULAR; INTRAVENOUS; SOFT TISSUE at 11:57

## 2025-04-10 RX ADMIN — Medication 100 MCG: at 12:15

## 2025-04-10 RX ADMIN — Medication 100 MCG: at 12:09

## 2025-04-10 RX ADMIN — FENTANYL CITRATE 50 MCG: 50 INJECTION, SOLUTION INTRAMUSCULAR; INTRAVENOUS at 11:57

## 2025-04-10 RX ADMIN — ONDANSETRON 4 MG: 2 INJECTION INTRAMUSCULAR; INTRAVENOUS at 12:02

## 2025-04-10 RX ADMIN — SODIUM CHLORIDE, SODIUM LACTATE, POTASSIUM CHLORIDE, CALCIUM CHLORIDE: 600; 310; 30; 20 INJECTION, SOLUTION INTRAVENOUS at 11:53

## 2025-04-10 RX ADMIN — GLYCOPYRROLATE 0.1 MG: 0.2 INJECTION, SOLUTION INTRAMUSCULAR; INTRAVENOUS at 12:09

## 2025-04-10 RX ADMIN — KETOROLAC TROMETHAMINE 30 MG: 30 INJECTION, SOLUTION INTRAMUSCULAR; INTRAVENOUS at 12:28

## 2025-04-10 RX ADMIN — Medication 3 G: at 11:53

## 2025-04-10 RX ADMIN — ACETAMINOPHEN 975 MG: 325 TABLET ORAL at 10:09

## 2025-04-10 RX ADMIN — LIDOCAINE HYDROCHLORIDE 80 MG: 20 INJECTION, SOLUTION INFILTRATION; PERINEURAL at 11:57

## 2025-04-10 RX ADMIN — FENTANYL CITRATE 50 MCG: 50 INJECTION, SOLUTION INTRAMUSCULAR; INTRAVENOUS at 12:10

## 2025-04-10 RX ADMIN — PROPOFOL 300 MG: 10 INJECTION, EMULSION INTRAVENOUS at 11:57

## 2025-04-10 NOTE — ANESTHESIA POSTPROCEDURE EVALUATION
Patient: Rasta Taylor    Procedure: Procedure(s):  Excision right elbow mass       Anesthesia Type:  General    Note:  Disposition: Outpatient   Postop Pain Control: Uneventful            Sign Out: Well controlled pain   PONV: No   Neuro/Psych: Uneventful            Sign Out: Acceptable/Baseline neuro status   Airway/Respiratory: Uneventful            Sign Out: Acceptable/Baseline resp. status   CV/Hemodynamics: Uneventful            Sign Out: Acceptable CV status; No obvious hypovolemia; No obvious fluid overload   Other NRE: NONE   DID A NON-ROUTINE EVENT OCCUR? No           Last vitals:  Vitals Value Taken Time   /77 04/10/25 1310   Temp 36.3  C (97.3  F) 04/10/25 1310   Pulse 81 04/10/25 1310   Resp 18 04/10/25 1310   SpO2 93 % 04/10/25 1310       Electronically Signed By: Jessica Hope MD  April 10, 2025  3:31 PM

## 2025-04-10 NOTE — DISCHARGE INSTRUCTIONS
"[facility]  Home Care Following Anesthesia  For 24 hours after surgery:  Get plenty of rest.  A responsible adult must stay with you for at least 24 hours after you leave the surgery center.  Do not drive or use heavy equipment.  If you have weakness or tingling, don't drive or use heavy equipment until this feeling goes away.   Do not drink alcohol.   Avoid strenuous or risky activities.  Ask for help when climbing stairs.  You may feel lightheaded.  IF so, sit for a few minutes before standing.  Have someone help you get up.   If you have nausea (feel sick to your stomach): Drink only clear liquids such as apple juice, ginger ale, broth or 7-Up.  Rest may also help.  Be sure to drink enough fluids.  Move to a regular diet as you feel able.   You may have a slight fever.  Call the doctor if your fever is over 100 F (37.7 C) (taken under the tongue) or lasts longer than 24 hours.  You may have a dry mouth, a sore throat, muscle aches or trouble sleeping. These should go away after 24 hours.  Do not make important or legal decisions.   It is recommended to avoid smoking.        Today you received a Marcaine or bupivacaine block to numb the nerves near your surgery site.  This is a block using local anesthetic or \"numbing\" medication injected around the nerves to anesthetize or \"numb\" the area supplied by those nerves.  This block is injected into the muscle layer near your surgical site.  The medication may numb the location where you had surgery for 6-18 hours, but may last up to 24 hours.  If your surgical site is an arm or leg you should be careful with your affected limb, since it is possible to injure your limb without being aware of it due to the numbing.  Until full feeling returns, you should guard against bumping or hitting your limb, and avoid extreme hot or cold temperatures on the skin.  As the block wears off, the feeling will return as a tingling or prickly " sensation near your surgical site.  You will experience more discomfort from your incision as the feeling returns.  You may want to take a pain pill (a narcotic or Tylenol if this was prescribed by your surgeon) when you start to experience mild pain before the pain beccomes more severe.  If your pain medications do not control your pain you should notifiy your surgeon.    Tips for taking pain medications  To get the best pain relief possible, remember these points:  Take pain medications as directed, before pain becomes severe.  Pain medication can upset your stomach: taking it with food may help.  Constipation is a common side effect of pain medication. Drink plenty of  fluids.  Eat foods high in fiber. Take a stool softener if recommended by your doctor or pharmacist.  Do not drink alcohol, drive or operate machinery while taking pain medications.  Ask about other ways to control pain, such as with heat, ice or relaxation.    Tylenol/Acetaminophen Consumption    If you feel your pain relief is insufficient, you may take Tylenol/Acetaminophen in addition to your narcotic pain medication.   Be careful not to exceed 4,000 mg of Tylenol/Acetaminophen in a 24 hour period from all sources.  If you are taking extra strength Tylenol/acetaminophen (500 mg), the maximum dose is 8 tablets in 24 hours.  If you are taking regular strength acetaminophen (325 mg), the maximum dose is 12 tablets in 24 hours.  Tylenol 975 mg given at 10am .   Ok to take more after 4 pm.   Toradol 30 mg given at  1230 pm.  Do not take any NSAIDs for 6 hours.  OK after 630 pm.  (Ibuprofen, Advil, Motrin, Naproxen, Aleve).       Call a doctor for any of the following:  Signs of infection (fever, growing tenderness at the surgery site, a large amount of drainage or bleeding, severe pain, foul-smelling drainage, redness, swelling).  It has been over 8 to 10 hours since surgery and you are still not able to urinate (pass water).  Headache for over 24  hours.  Numbness, tingling or weakness the day after surgery (if you had spinal anesthesia).  Signs of Covid-19 infection (temperature over 100 degrees, shortness of breath, cough, loss of taste/smell, generalized body aches, persistent headache, chills, sore throat, nausea/vomiting/diarrhea)    Your doctor is:       Dr. Hardik Salazar, Orthopaedics: [phone]               After hours and weekends call the hospital @ [phone] and ask for the resident on call for:  Orthopaedics  For emergency care, call the:  [facility] Emergency Department: [phone] (TTY for hearing impaired: [phone])

## 2025-04-10 NOTE — ANESTHESIA CARE TRANSFER NOTE
Patient: Rasta Taylor    Procedure: Procedure(s):  Excision right elbow mass       Diagnosis: Soft tissue mass [M79.89]  Diagnosis Additional Information: No value filed.    Anesthesia Type:   General     Note:      Level of Consciousness: awake  Oxygen Supplementation: face mask    Independent Airway: airway patency satisfactory and stable        Patient transferred to: PACU    Handoff Report: Identifed the Patient, Identified the Reponsible Provider, Reviewed the pertinent medical history, Discussed the surgical course, Reviewed Intra-OP anesthesia mangement and issues during anesthesia, Set expectations for post-procedure period and Allowed opportunity for questions and acknowledgement of understanding  Vitals:  Vitals Value Taken Time   /73 04/10/25 1248   Temp 36.4  C (97.5  F) 04/10/25 1248   Pulse 84 04/10/25 1249   Resp 12 04/10/25 1249   SpO2 98 % 04/10/25 1249   Vitals shown include unfiled device data.    Electronically Signed By: MARY Castillo CRNA  April 10, 2025  12:50 PM

## 2025-04-10 NOTE — ANESTHESIA PROCEDURE NOTES
Airway       Patient location during procedure: OR  Staff -        CRNA: Rosetta Noriega APRN CRNA       Performed By: CRNAIndications and Patient Condition       Indications for airway management: latesha-procedural       Induction type:intravenous       Mask difficulty assessment: 1 - vent by mask    Final Airway Details       Final airway type: supraglottic airway    Supraglottic Airway Details        Type: LMA       Brand: I-Gel       LMA size: 5    Post intubation assessment        Placement verified by: capnometry, equal breath sounds and chest rise        Number of attempts at approach: 1       Secured with: tape       Ease of procedure: easy       Dentition: Intact and Unchanged

## 2025-04-10 NOTE — BRIEF OP NOTE
[facility] [place]    Brief Operative Note    Pre-operative diagnosis: Soft tissue mass [M79.89]  Post-operative diagnosis Same as pre-operative diagnosis    Procedure: Excision right elbow mass, Right - Arm    Surgeon: Surgeons and Role:     * Hardik Salazar MD - Primary     * Derrell Conrad MD - Resident - Assisting  Anesthesia: General   Estimated Blood Loss: 5 mL from 4/10/2025 11:54 AM to 4/10/2025 12:46 PM      Drains: None  Specimens:   ID Type Source Tests Collected by Time Destination   1 : Right Elbow Mass Tissue Elbow, Right SURGICAL PATHOLOGY EXAM Hardik Salazar MD 4/10/2025 12:21 PM      Findings:   See operative note .  Complications: None.  Implants: * No implants in log *    - WBAT on the RUE  - Elbow motion as tolerated   - Dressing for 5-7 days   - OK to shower but keep dressing dry   - Elevate RUE and ice the RUE   - Oral meds fr pain  - Follow up as scheduled on 04/30

## 2025-04-10 NOTE — OP NOTE
DATE OF SURGERY: 4/10/2025    PREOPERATIVE DIAGNOSIS: Right elbow mass    POSTOPERATIVE DIAGNOSIS: Right elbow mass    PROCEDURE: Excision right elbow mass, deep, 2.5 cm    SURGEON: Hardik Salazar MD     ASSISTANT: Derrell Conrad MD    PATIENT HISTORY: This patient has a history of a longstanding right elbow mass and presents now to have this excised understanding the risks of infection bleeding pain numbness and tingling.      DESCRIPTION OF PROCEDURE: The patient underwent successful induction of anesthesia.  The right arm was washed and sterilely prepped and draped.  We made an incision over the mass sharply divided the subcutaneous tissue with cautery and then opened up the muscle and bluntly dissected around the fibers down to the mass.  We divided a couple muscle fibers that were investing the mass and then the mass was removed in 1 piece and sent to pathology.  We then cauterized a small bleeding vessel and irrigated.  We then closed the fascia with Vicryl used Vicryl in the subcutaneous tissue and used Monocryl in the skin followed by Steri-Strips and a sterile dry dressing.  The patient was then extubated and taken to the recovery room in stable condition.  The estimated blood loss is 5 mL.  There were no complications.  I was present for all critical portions of the procedure.    Hardik Salazar MD

## 2025-04-14 LAB
PATH REPORT.COMMENTS IMP SPEC: NORMAL
PATH REPORT.COMMENTS IMP SPEC: NORMAL
PATH REPORT.FINAL DX SPEC: NORMAL
PATH REPORT.GROSS SPEC: NORMAL
PATH REPORT.MICROSCOPIC SPEC OTHER STN: NORMAL
PATH REPORT.RELEVANT HX SPEC: NORMAL
PHOTO IMAGE: NORMAL

## 2025-04-30 ENCOUNTER — OFFICE VISIT (OUTPATIENT)
Dept: ORTHOPEDICS | Facility: CLINIC | Age: 50
End: 2025-04-30
Payer: COMMERCIAL

## 2025-04-30 DIAGNOSIS — D36.10 SCHWANNOMA: Primary | ICD-10-CM

## 2025-04-30 NOTE — LETTER
4/30/2025      Rasta Taylor  [address] [place] [address]      Dear Colleague,    Thank you for referring your patient, Rasta Taylor, to the [facility] [place]. Please see a copy of my visit note below.    Chief Complaint: wound check  DATE OF SURGERY: 4/10/2025  POSTOPERATIVE DIAGNOSIS: Right elbow mass  PROCEDURE: Excision right elbow mass, deep, 2.5 cm  SURGEON: Hardik Salazar MD     HPI: Jorge is a 50 year old man who is here 3 weeks s/p excision of right elbow mass.  Patient reports overall he is doing well.  He does have intermittent numbness and tingling that starts just distal to the incision and goes to his right thumb and index finger.  It comes and goes.  He can move the fingers fine.  Minimal pain.  He is right handed.  He is back to work, mostly desk work, but periodically has to take breaks due to the symptoms.  No other concerns.  He mentions that he had a meningioma in his brain that was removed.     Physical Exam: Jorge is a 50 year old man who is alert and oriented and in no distress.  Right elbow incision is well healed with no erythema or drainage.  Mild swelling.  Mild tenderness.  He has full elbow, wrist and hand ROM today.  No weakness of finger abduction/adduction, or thumb extension/flexion/adduction.  Some slight decreased sensation circumferentially at base of right thumb to IP joint, as well as along the webspace.    Pathology:   Final Diagnosis   Right elbow, soft tissue, excision:   - Schwannoma, 2.2 cm  - Negative for atypia or malignancy     Impression: 50 year old man s/p excision of right elbow mass consistent with benign schwannoma, with intermittent numbness and tingling of right forearm/hand, motor intact    Plan: Patient can shower and get the incision wet.  No soaking in a tub or pool for 2 weeks.  OK to use vitamin E oil or cocoa butter on the incision.  Cover incision as needed for comfort.  Continue to ice and elevate for swelling  control as needed.  Gradually progress back to all activities as tolerated.  I believe that the neuro symptoms will completely resolve, but may take several weeks to a couple months. No further imaging or follow-up needed as long as he continues to improve.  If not improving in 2 months or worsening, Call with any concerns. Patient agrees with the plan. All questions were answered today.        Again, thank you for allowing me to participate in the care of your patient.        Sincerely,        Ingrid Nelson PA-C    Electronically signed

## 2025-04-30 NOTE — PROGRESS NOTES
Chief Complaint: wound check  DATE OF SURGERY: 4/10/2025  POSTOPERATIVE DIAGNOSIS: Right elbow mass  PROCEDURE: Excision right elbow mass, deep, 2.5 cm  SURGEON: Hardik Salazar MD     HPI: Jorge is a 50 year old man who is here 3 weeks s/p excision of right elbow mass.  Patient reports overall he is doing well.  He does have intermittent numbness and tingling that starts just distal to the incision and goes to his right thumb and index finger.  It comes and goes.  He can move the fingers fine.  Minimal pain.  He is right handed.  He is back to work, mostly desk work, but periodically has to take breaks due to the symptoms.  No other concerns.  He mentions that he had a meningioma in his brain that was removed.     Physical Exam: Jorge is a 50 year old man who is alert and oriented and in no distress.  Right elbow incision is well healed with no erythema or drainage.  Mild swelling.  Mild tenderness.  He has full elbow, wrist and hand ROM today.  No weakness of finger abduction/adduction, or thumb extension/flexion/adduction.  Some slight decreased sensation circumferentially at base of right thumb to IP joint, as well as along the webspace.    Pathology:   Final Diagnosis   Right elbow, soft tissue, excision:   - Schwannoma, 2.2 cm  - Negative for atypia or malignancy     Impression: 50 year old man s/p excision of right elbow mass consistent with benign schwannoma, with intermittent numbness and tingling of right forearm/hand, motor intact    Plan: Patient can shower and get the incision wet.  No soaking in a tub or pool for 2 weeks.  OK to use vitamin E oil or cocoa butter on the incision.  Cover incision as needed for comfort.  Continue to ice and elevate for swelling control as needed.  Gradually progress back to all activities as tolerated.  I believe that the neuro symptoms will completely resolve, but may take several weeks to a couple months. No further imaging or follow-up needed as long as he continues  to improve.  If not improving in 2 months or worsening, Call with any concerns. Patient agrees with the plan. All questions were answered today.       Rifampin Pregnancy And Lactation Text: This medication is Pregnancy Category C and it isn't know if it is safe during pregnancy. It is also excreted in breast milk and should not be used if you are breast feeding.

## 2025-04-30 NOTE — NURSING NOTE
Reason For Visit:   Chief Complaint   Patient presents with    Surgical Followup     DOS 4/10/25 S/P Excision right elbow mass        There were no vitals taken for this visit.    Pain Assessment  Patient Currently in Pain: Yes  0-10 Pain Scale: 2  Primary Pain Location: Hand  Pain Descriptors: Discomfort    Kristal Salcedo LPN

## 2025-08-16 ENCOUNTER — HEALTH MAINTENANCE LETTER (OUTPATIENT)
Age: 50
End: 2025-08-16

## (undated) RX ORDER — FENTANYL CITRATE 50 UG/ML
INJECTION, SOLUTION INTRAMUSCULAR; INTRAVENOUS
Status: DISPENSED
Start: 2025-04-10

## (undated) RX ORDER — ACETAMINOPHEN 325 MG/1
TABLET ORAL
Status: DISPENSED
Start: 2025-04-10

## (undated) RX ORDER — LORAZEPAM 0.5 MG/1
TABLET ORAL
Status: DISPENSED
Start: 2020-09-23

## (undated) RX ORDER — CEFAZOLIN SODIUM 2 G/50ML
SOLUTION INTRAVENOUS
Status: DISPENSED
Start: 2025-04-10

## (undated) RX ORDER — LIDOCAINE 40 MG/G
CREAM TOPICAL
Status: DISPENSED
Start: 2020-09-23

## (undated) RX ORDER — DEXAMETHASONE 4 MG/1
TABLET ORAL
Status: DISPENSED
Start: 2020-09-23